# Patient Record
Sex: FEMALE | Race: WHITE | NOT HISPANIC OR LATINO | URBAN - METROPOLITAN AREA
[De-identification: names, ages, dates, MRNs, and addresses within clinical notes are randomized per-mention and may not be internally consistent; named-entity substitution may affect disease eponyms.]

---

## 2017-11-02 ENCOUNTER — INPATIENT HOSPITAL (OUTPATIENT)
Dept: URBAN - METROPOLITAN AREA HOSPITAL 95 | Facility: HOSPITAL | Age: 42
End: 2017-11-02
Payer: COMMERCIAL

## 2017-11-02 DIAGNOSIS — K52.9 NONINFECTIVE GASTROENTERITIS AND COLITIS, UNSPECIFIED: ICD-10-CM

## 2017-11-02 DIAGNOSIS — K92.1 MELENA: ICD-10-CM

## 2017-11-02 PROCEDURE — 99253 IP/OBS CNSLTJ NEW/EST LOW 45: CPT | Performed by: INTERNAL MEDICINE

## 2017-11-07 ENCOUNTER — OFFICE (OUTPATIENT)
Dept: URBAN - METROPOLITAN AREA CLINIC 19 | Facility: CLINIC | Age: 42
End: 2017-11-07
Payer: COMMERCIAL

## 2017-11-07 VITALS
SYSTOLIC BLOOD PRESSURE: 144 MMHG | WEIGHT: 242 LBS | HEART RATE: 93 BPM | HEIGHT: 65 IN | DIASTOLIC BLOOD PRESSURE: 93 MMHG

## 2017-11-07 DIAGNOSIS — K51.511 LEFT SIDED COLITIS WITH RECTAL BLEEDING: ICD-10-CM

## 2017-11-07 DIAGNOSIS — E66.01 MORBID (SEVERE) OBESITY DUE TO EXCESS CALORIES: ICD-10-CM

## 2017-11-07 DIAGNOSIS — F17.200 NICOTINE DEPENDENCE, UNSPECIFIED, UNCOMPLICATED: ICD-10-CM

## 2017-11-07 DIAGNOSIS — Z79.1 LONG TERM (CURRENT) USE OF NON-STEROIDAL ANTI-INFLAMMATORIES: ICD-10-CM

## 2017-11-07 PROCEDURE — 99213 OFFICE O/P EST LOW 20 MIN: CPT | Performed by: INTERNAL MEDICINE

## 2017-11-07 RX ORDER — SODIUM PICOSULFATE, MAGNESIUM OXIDE, AND ANHYDROUS CITRIC ACID 10; 3.5; 12 MG/16.1G; G/16.1G; G/16.1G
POWDER, METERED ORAL
Qty: 1 | Refills: 0 | Status: COMPLETED
Start: 2017-11-07 | End: 2017-12-13

## 2017-11-07 NOTE — SERVICEHPINOTES
42-year-old  female  who I saw at [facility] last week .  she has presented with sudden onset of diffuse abdominal pain with rectal bleeding.  this occurred after she is taking BC powders on a daily basis for about 10 days.  CT scan had showed descending colon colitis, this was a CT angiogram - no vascular compromise.  Stool had too numerous to count WBCs.  Stool was negative for C diff.  this was the 1st time she had bright red bleeding per rectum.  she however has these episodes of episodic diarrhea with abdominal pain over the past few years ( a total of 3 times but no bleeding each time).  no fever no chills.  no weight loss.  no family history or personal history of IBD.  she smokes cigarettes  and apparently carries a diagnosis of  lupus.

## 2017-11-07 NOTE — SERVICENOTES
Antibiotics finished .  Feels better.  Soft abdomen.  No more rectal bleeding.  Need to do colonoscopy

## 2017-12-13 ENCOUNTER — AMBULATORY SURGICAL CENTER (OUTPATIENT)
Dept: URBAN - METROPOLITAN AREA SURGERY 3 | Facility: SURGERY | Age: 42
End: 2017-12-13

## 2017-12-13 ENCOUNTER — AMBULATORY SURGICAL CENTER (OUTPATIENT)
Dept: URBAN - METROPOLITAN AREA SURGERY 3 | Facility: SURGERY | Age: 42
End: 2017-12-13
Payer: COMMERCIAL

## 2017-12-13 VITALS
DIASTOLIC BLOOD PRESSURE: 83 MMHG | WEIGHT: 242 LBS | DIASTOLIC BLOOD PRESSURE: 83 MMHG | RESPIRATION RATE: 20 BRPM | OXYGEN SATURATION: 96 % | DIASTOLIC BLOOD PRESSURE: 72 MMHG | HEART RATE: 80 BPM | TEMPERATURE: 98.2 F | SYSTOLIC BLOOD PRESSURE: 128 MMHG | HEART RATE: 80 BPM | TEMPERATURE: 97.9 F | WEIGHT: 242 LBS | OXYGEN SATURATION: 97 % | RESPIRATION RATE: 19 BRPM | DIASTOLIC BLOOD PRESSURE: 78 MMHG | HEART RATE: 88 BPM | RESPIRATION RATE: 24 BRPM | SYSTOLIC BLOOD PRESSURE: 133 MMHG | HEART RATE: 87 BPM | OXYGEN SATURATION: 95 % | RESPIRATION RATE: 18 BRPM | RESPIRATION RATE: 20 BRPM | OXYGEN SATURATION: 95 % | HEART RATE: 85 BPM | HEART RATE: 81 BPM | DIASTOLIC BLOOD PRESSURE: 78 MMHG | OXYGEN SATURATION: 98 % | SYSTOLIC BLOOD PRESSURE: 138 MMHG | SYSTOLIC BLOOD PRESSURE: 131 MMHG | RESPIRATION RATE: 24 BRPM | HEART RATE: 81 BPM | SYSTOLIC BLOOD PRESSURE: 121 MMHG | OXYGEN SATURATION: 97 % | HEART RATE: 85 BPM | SYSTOLIC BLOOD PRESSURE: 128 MMHG | SYSTOLIC BLOOD PRESSURE: 121 MMHG | DIASTOLIC BLOOD PRESSURE: 77 MMHG | DIASTOLIC BLOOD PRESSURE: 72 MMHG | DIASTOLIC BLOOD PRESSURE: 77 MMHG | SYSTOLIC BLOOD PRESSURE: 131 MMHG | TEMPERATURE: 97.9 F | TEMPERATURE: 98.2 F | HEART RATE: 87 BPM | OXYGEN SATURATION: 96 % | SYSTOLIC BLOOD PRESSURE: 138 MMHG | HEIGHT: 65 IN | SYSTOLIC BLOOD PRESSURE: 133 MMHG | OXYGEN SATURATION: 98 % | HEART RATE: 88 BPM | HEIGHT: 65 IN | RESPIRATION RATE: 18 BRPM | RESPIRATION RATE: 19 BRPM

## 2017-12-13 DIAGNOSIS — K64.8 OTHER HEMORRHOIDS: ICD-10-CM

## 2017-12-13 DIAGNOSIS — K62.5 HEMORRHAGE OF ANUS AND RECTUM: ICD-10-CM

## 2017-12-13 PROBLEM — K92.2 EVALUATION OF UNEXPLAINED GI BLEEDING: Status: ACTIVE | Noted: 2017-12-13

## 2017-12-13 PROCEDURE — 45378 DIAGNOSTIC COLONOSCOPY: CPT | Performed by: INTERNAL MEDICINE

## 2017-12-13 NOTE — SERVICENOTES
no colitis . recent hematochezia likely a result of colitis that she had recently . No evidence of IBD .

## 2017-12-13 NOTE — SERVICEHPINOTES
42-year-old  female who I saw at [facility] last week . she has presented with sudden onset of diffuse abdominal pain with rectal bleeding. this occurred after she is taking BC powders on a daily basis for about 10 days. CT scan had showed descending colon colitis, this was a CT angiogram - no vascular compromise. Stool had too numerous to count WBCs. Stool was negative for C diff. this was the 1st time she had bright red bleeding per rectum. she however has these episodes of episodic diarrhea with abdominal pain over the past few years ( a total of 3 times but no bleeding each time). no fever no chills. no weight loss. no family history or personal history of IBD. she smokes cigarettes and apparently carries a diagnosis of lupus.

## 2017-12-13 NOTE — SERVICEHPINOTES
42-year-old  female who I saw at [facility] last week . she has presented with sudden onset of diffuse abdominal pain with rectal bleeding. this occurred after she is taking BC powders on a daily basis for about 10 days. CT scan had showed descending colon colitis, this was a CT angiogram - no vascular compromise. Stool had too numerous to count WBCs. Stool was negative for C diff. this was the 1st time she had bright red bleeding per rectum. she however has these episodes of episodic diarrhea with abdominal pain over the past few years ( a total of 3 times but no bleeding each time). no fever no chills. no weight loss. no family history or personal history of IBD. she smokes cigarettes and apparently carries a diagnosis of lupus.

## 2025-01-17 ENCOUNTER — OFFICE (OUTPATIENT)
Dept: URBAN - METROPOLITAN AREA CLINIC 11 | Facility: CLINIC | Age: 50
End: 2025-01-17
Payer: COMMERCIAL

## 2025-01-17 VITALS
OXYGEN SATURATION: 94 % | SYSTOLIC BLOOD PRESSURE: 146 MMHG | WEIGHT: 175 LBS | DIASTOLIC BLOOD PRESSURE: 86 MMHG | DIASTOLIC BLOOD PRESSURE: 79 MMHG | HEART RATE: 81 BPM | HEIGHT: 65 IN

## 2025-01-17 DIAGNOSIS — K59.00 CONSTIPATION, UNSPECIFIED: ICD-10-CM

## 2025-01-17 PROCEDURE — 99204 OFFICE O/P NEW MOD 45 MIN: CPT | Performed by: INTERNAL MEDICINE

## 2025-01-17 RX ORDER — POLYETHYLENE GLYCOL 3350, SODIUM SULFATE ANHYDROUS, SODIUM BICARBONATE, SODIUM CHLORIDE, POTASSIUM CHLORIDE 236; 22.74; 6.74; 5.86; 2.97 G/4L; G/4L; G/4L; G/4L; G/4L
POWDER, FOR SOLUTION ORAL
Qty: 4000 | Refills: 0 | Status: COMPLETED
Start: 2025-01-17 | End: 2025-01-27

## 2025-01-17 NOTE — SERVICEHPINOTES
Patient is a 49-year-old  woman who presents for initial evaluation of bothersome constipation that she has been experiencing over the last few months.  Patient is on Mounjaro,  which she has been on for over a year. The dose was increased to 10 milligrams about 4-5 months ago after she experienced worsening constipation, and since then the dose was decreased to 7.5.  Despite this, patient has continued to feel a sense of incomplete evacuation, difficult evacuation, and a pressure in the rectum.  Patient reports that if she could just get  this stools out, she would feel much better.   she has used a couple of enemas at home p.r.n. with mild relief. 
br
br Patient was prescribed Linzess 145 micrograms which she has been taking p.r.n..  Patient also reports that stools are hard, and resemble small balls.  Patient does not drink that much water, and drinks tea.  She has had colonoscopy in December 2017 that was normal aside from internal hemorrhoids, and was given a 10 year follow-up.

## 2025-01-27 ENCOUNTER — AMBULATORY SURGICAL CENTER (OUTPATIENT)
Dept: URBAN - METROPOLITAN AREA SURGERY 1 | Facility: SURGERY | Age: 50
End: 2025-01-27
Payer: COMMERCIAL

## 2025-01-27 VITALS
OXYGEN SATURATION: 98 % | HEIGHT: 65 IN | OXYGEN SATURATION: 100 % | WEIGHT: 166 LBS | SYSTOLIC BLOOD PRESSURE: 154 MMHG | RESPIRATION RATE: 16 BRPM | SYSTOLIC BLOOD PRESSURE: 187 MMHG | DIASTOLIC BLOOD PRESSURE: 109 MMHG | TEMPERATURE: 98.4 F | SYSTOLIC BLOOD PRESSURE: 138 MMHG | HEART RATE: 88 BPM | TEMPERATURE: 97.4 F | HEART RATE: 82 BPM | DIASTOLIC BLOOD PRESSURE: 80 MMHG | SYSTOLIC BLOOD PRESSURE: 150 MMHG | RESPIRATION RATE: 18 BRPM | HEART RATE: 98 BPM | HEART RATE: 89 BPM | DIASTOLIC BLOOD PRESSURE: 106 MMHG | SYSTOLIC BLOOD PRESSURE: 171 MMHG | SYSTOLIC BLOOD PRESSURE: 176 MMHG | HEART RATE: 95 BPM | DIASTOLIC BLOOD PRESSURE: 103 MMHG | DIASTOLIC BLOOD PRESSURE: 96 MMHG | DIASTOLIC BLOOD PRESSURE: 97 MMHG

## 2025-01-27 DIAGNOSIS — K57.30 DIVERTICULOSIS OF LARGE INTESTINE WITHOUT PERFORATION OR ABS: ICD-10-CM

## 2025-01-27 DIAGNOSIS — K59.00 CONSTIPATION, UNSPECIFIED: ICD-10-CM

## 2025-01-27 DIAGNOSIS — R19.8 OTHER SPECIFIED SYMPTOMS AND SIGNS INVOLVING THE DIGESTIVE S: ICD-10-CM

## 2025-01-27 PROCEDURE — 45330 DIAGNOSTIC SIGMOIDOSCOPY: CPT | Performed by: INTERNAL MEDICINE

## 2025-04-11 ENCOUNTER — OFFICE (OUTPATIENT)
Dept: URBAN - METROPOLITAN AREA CLINIC 10 | Facility: CLINIC | Age: 50
End: 2025-04-11
Payer: COMMERCIAL

## 2025-04-11 VITALS
OXYGEN SATURATION: 99 % | DIASTOLIC BLOOD PRESSURE: 89 MMHG | SYSTOLIC BLOOD PRESSURE: 140 MMHG | SYSTOLIC BLOOD PRESSURE: 162 MMHG | DIASTOLIC BLOOD PRESSURE: 94 MMHG | HEIGHT: 65 IN | WEIGHT: 172 LBS | HEART RATE: 78 BPM

## 2025-04-11 DIAGNOSIS — K64.8 OTHER HEMORRHOIDS: ICD-10-CM

## 2025-04-11 DIAGNOSIS — K59.00 CONSTIPATION, UNSPECIFIED: ICD-10-CM

## 2025-04-11 PROCEDURE — 99214 OFFICE O/P EST MOD 30 MIN: CPT | Performed by: NURSE PRACTITIONER

## 2025-04-11 RX ORDER — HYDROCORTISONE ACETATE 25 MG/1
50 SUPPOSITORY RECTAL
Qty: 14 | Refills: 1 | Status: ACTIVE
Start: 2025-04-11